# Patient Record
Sex: FEMALE | Race: WHITE
[De-identification: names, ages, dates, MRNs, and addresses within clinical notes are randomized per-mention and may not be internally consistent; named-entity substitution may affect disease eponyms.]

---

## 2017-10-26 ENCOUNTER — HOSPITAL ENCOUNTER (EMERGENCY)
Dept: HOSPITAL 25 - UCCORT | Age: 62
Discharge: HOME | End: 2017-10-26
Payer: COMMERCIAL

## 2017-10-26 VITALS — DIASTOLIC BLOOD PRESSURE: 57 MMHG | SYSTOLIC BLOOD PRESSURE: 165 MMHG

## 2017-10-26 DIAGNOSIS — F17.210: ICD-10-CM

## 2017-10-26 DIAGNOSIS — J44.9: Primary | ICD-10-CM

## 2017-10-26 DIAGNOSIS — J45.901: ICD-10-CM

## 2017-10-26 PROCEDURE — G0463 HOSPITAL OUTPT CLINIC VISIT: HCPCS

## 2017-10-26 PROCEDURE — 99212 OFFICE O/P EST SF 10 MIN: CPT

## 2017-10-26 NOTE — UC
Respiratory Complaint HPI





- HPI Summary


HPI Summary: 





Pt presents with c/o of cough and wheezing X 1 week. 





- History of Current Complaint


Chief Complaint: UCRespiratory


Stated Complaint: COUGH,SORE THROAT


Time Seen by Provider: 10/26/17 15:10


Hx Obtained From: Patient


Pregnant?: No


Onset/Duration: Gradual Onset, Lasting Weeks - 1


Timing: Constant


Severity Initially: Mild


Severity Currently: Mild


Character: Cough: Productive, Sputum Description: - clear


Aggravating Factors: Deep Breaths, Recumbent Position


Alleviating Factors: Nothing


Associated Signs And Symptoms: Positive: Wheezing, Nasal Congestion





- Risk Factors


Pulmonary Embolism Risk Factors: Smoking


Cardiac Risk Factors: Smoking


Pseudomonas Risk Factors: Chronic Lung Disease


Tuberculosis Risk Factors: Smoking





- Allergies/Home Medications


Allergies/Adverse Reactions: 


 Allergies











Allergy/AdvReac Type Severity Reaction Status Date / Time


 


No Known Allergies Allergy   Verified 10/26/17 14:30











Home Medications: 


 Home Medications





Incuse Copd Inhaler 1 puff INH ONCE 10/26/17 [History]


Lisinopril [Lisinopril 30 MG-] 30 mg PO DAILY 10/26/17 [History Confirmed 10/26/

17]











PMH/Surg Hx/FS Hx/Imm Hx


Previously Healthy: Yes





- Surgical History


Surgical History: Yes


Surgery Procedure, Year, and Place: tubal.  left ear surgery





- Family History


Known Family History: Positive: Cardiac Disease


Family History: no known cardiovascular issues in family lineage





- Social History


Occupation: Works From/At Home


Lives: With Family


Alcohol Use: None


Substance Use Type: None


Smoking Status (MU): Heavy Every Day Tobacco Smoker


Type: Cigarettes


Amount Used/How Often: 2 packs day


Length of Time of Smoking/Using Tobacco: AGE 17


Have You Smoked in the Last Year: Yes


Household Exposure Type: Cigarettes





Review of Systems


Constitutional: Fatigue


Skin: Negative


Eyes: Negative


ENT: Negative


Respiratory: Shortness Of Breath, Cough, Other - wheezing


Cardiovascular: Negative


Gastrointestinal: Negative


Genitourinary: Negative


Motor: Negative


Neurovascular: Negative


Musculoskeletal: Negative


Neurological: Negative


Psychological: Negative


Is Patient Immunocompromised?: No


All Other Systems Reviewed And Are Negative: Yes





Physical Exam


Triage Information Reviewed: Yes


Appearance: Ill-Appearing


Vital Signs: 


 Initial Vital Signs











Temp  98.5 F   10/26/17 14:21


 


Pulse  77   10/26/17 14:21


 


Resp  28   10/26/17 14:21


 


BP  165/57   10/26/17 14:21


 


Pulse Ox  95   10/26/17 14:21











Vital Signs Reviewed: Yes


Eye Exam: Normal


ENT Exam: Other


ENT: Positive: Nasal congestion


Neck exam: Normal


Respiratory Exam: Other


Respiratory: Positive: Decreased breath sounds, Wheezing


Cardiovascular Exam: Normal


Musculoskeletal Exam: Normal


Neurological Exam: Normal


Psychological Exam: Normal


Skin Exam: Normal





UC Diagnostic Evaluation





- Laboratory


O2 Sat by Pulse Oximetry: 95





Respiratory Course/Dx





- Differential Dx/Diagnosis


Differential Diagnosis/HQI/PQRI: Bronchitis


Provider Diagnoses: bronchitis.  exacerbation of COPD





Discharge





- Discharge Plan


Condition: Stable


Disposition: HOME


Prescriptions: 


Albuterol HFA INHALER* [Ventolin HFA Inhaler*] 2 puff INH Q4H PRN #1 mdi


 PRN Reason: Sob/Wheezing


Azithromycin TAB* [Zithromax TAB (Z-DEISY) 250 mg #6 tabs] 2 tab PO .TODAY, THEN 

1 DAILY #1 deisy


Benzonatate CAP* [Tessalon 100 MG CAP*] 100 mg PO Q8H PRN #30 cap


 PRN Reason: Cough


Cetirizine* [ZyrTEC 10 MG TAB*] 10 mg PO DAILY #20 tab


methylPREDNISolone TAB* [Medrol TAB*] 4 - 8 mg PO .SEE DEISY #1 deisy


Patient Education Materials:  Acute Bronchitis (ED), Allergies (ED), Wheezing (

ED)


Referrals: 


LUIS Morrow [Medical Doctor] - If Needed

## 2019-10-17 ENCOUNTER — HOSPITAL ENCOUNTER (EMERGENCY)
Dept: HOSPITAL 25 - UCCORT | Age: 64
Discharge: HOME | End: 2019-10-17
Payer: COMMERCIAL

## 2019-10-17 VITALS — SYSTOLIC BLOOD PRESSURE: 149 MMHG | DIASTOLIC BLOOD PRESSURE: 54 MMHG

## 2019-10-17 DIAGNOSIS — J44.9: ICD-10-CM

## 2019-10-17 DIAGNOSIS — I10: ICD-10-CM

## 2019-10-17 DIAGNOSIS — F17.210: ICD-10-CM

## 2019-10-17 DIAGNOSIS — E78.5: ICD-10-CM

## 2019-10-17 DIAGNOSIS — J18.9: Primary | ICD-10-CM

## 2019-10-17 PROCEDURE — G0463 HOSPITAL OUTPT CLINIC VISIT: HCPCS

## 2019-10-17 PROCEDURE — 99212 OFFICE O/P EST SF 10 MIN: CPT

## 2019-10-17 PROCEDURE — 71046 X-RAY EXAM CHEST 2 VIEWS: CPT

## 2019-10-17 NOTE — UC
Respiratory Complaint HPI





- HPI Summary


HPI Summary: 





Per RN triage:


"Pt has been coughing since Tuesday am. Pt is not sleeping due to cough and 

becomes short of breath with exertion. "


-sx started 10/15 AM. smokes 2 PPD. uses inctuise and alb prn. last alb was > 6 

hrs ago. 


-denies fevers/chills. + COPD


-she is deaf.  is with her and he helps translate by lip reading him. 


-she has nebulizer at home but hasnt used it. O2 sat usually runs 94%








- History of Current Complaint


Chief Complaint: UCRespiratory


Stated Complaint: COUGH,NO SLEEP,COUGH


Time Seen by Provider: 10/17/19 16:35


Pain Intensity: 0





- Allergies/Home Medications


Allergies/Adverse Reactions: 


 Allergies











Allergy/AdvReac Type Severity Reaction Status Date / Time


 


No Known Allergies Allergy   Verified 10/17/19 16:38











Home Medications: 


 Home Medications





Amlodipine Besylate [Norvasc] 10 mg PO DAILY 10/17/19 [History Confirmed 10/17/

19]


Aspirin 81 mg CHEW TAB* 81 mg PO DAILY 10/17/19 [History Confirmed 10/17/19]


Atorvastatin* [Lipitor 20 MG*] 20 mg PO 1700 10/17/19 [History Confirmed 10/17/

19]


Lisinopril [Lisinopril 30 MG-] 30 mg PO DAILY 10/17/19 [History Confirmed 10/17/

19]


Loratadine 10 mg PO DAILY 10/17/19 [History Confirmed 10/17/19]


Nitroglycerin 0.4 mg SL SEE INSTRUCTIONS PRN 10/17/19 [History Confirmed 10/17/

19]


Omeprazole 40 mg PO DAILY 10/17/19 [History Confirmed 10/17/19]


Umeclidinium Bromide [Incruse Ellipta] 62.5 mcg IN DAILY 10/17/19 [History 

Confirmed 10/17/19]


hydroCHLOROthiazide [Hydrochlorothiazide] 12.5 mg PO DAILY 10/17/19 [History 

Confirmed 10/17/19]











PMH/Surg Hx/FS Hx/Imm Hx


Endocrine History: Dyslipidemia


Cardiovascular History: Hypertension


Respiratory History: COPD





- Surgical History


Surgical History: Yes


Surgery Procedure, Year, and Place: tubal.  left ear surgery





- Family History


Known Family History: Positive: Cardiac Disease


Family History: no known cardiovascular issues in family lineage





- Social History


Alcohol Use: None


Substance Use Type: None


Smoking Status (MU): Heavy Every Day Tobacco Smoker


Type: Cigarettes


Amount Used/How Often: 2 packs day


Length of Time of Smoking/Using Tobacco: AGE 17


Have You Smoked in the Last Year: Yes


Household Exposure Type: Cigarettes





Review of Systems


All Other Systems Reviewed And Are Negative: Yes


Constitutional: Positive: Negative


Skin: Positive: Negative.  Negative: Rash


Eyes: Positive: Negative


ENT: Positive: Negative


Respiratory: Positive: Cough


Cardiovascular: Positive: Negative.  Negative: Palpitations, Chest Pain


Gastrointestinal: Positive: Negative.  Negative: Vomiting, Diarrhea, Nausea


Genitourinary: Positive: Negative


Motor: Positive: Negative


Neurovascular: Positive: Negative


Musculoskeletal: Positive: Negative


Neurological: Positive: Negative


Psychological: Positive: Negative


Is Patient Immunocompromised?: No





Physical Exam


Triage Information Reviewed: Yes


Appearance: Well-Nourished - appears mild-moderately ill


Vital Signs: 


 Initial Vital Signs











Temp  97.8 F   10/17/19 16:31


 


Pulse  66   10/17/19 16:31


 


Resp  20   10/17/19 16:31


 


BP  149/54   10/17/19 16:31


 


Pulse Ox  90   10/17/19 16:31











Eye Exam: Normal


ENT Exam: Normal


ENT: Positive: Pharynx normal, TMs normal


Neck exam: Normal


Neck: Positive: Supple, Nontender, No Lymphadenopathy


Respiratory: Positive: Chest non-tender, Rhonchi - loud b.l throughout w/ mod 

bretah sounds. appears comnfortable other than mild-mod cough. slightly 

decreased after alb neb. she feels more comfortable. )2 90-91% post-neb.  

Negative: No respiratory distress, No accessory muscle use, Crackles, Wheezing


Cardiovascular Exam: Normal


Cardiovascular: Positive: RRR


Abdominal Exam: Normal


Musculoskeletal Exam: Normal


Neurological Exam: Normal


Psychological Exam: Normal


Skin Exam: Normal





Respiratory Course/Dx





- Course


Course Of Treatment: 





CXR: "IMPRESSION: MINIMAL LINEAR ATELECTASIS VERSUS EARLY CONSOLIDATION OF THE 

LEFT LUNG BASE."


-has some imporvement in sx after neb. she appears comfaortable. 


-they do not think she needs to go to the ER


-she smokes 2 PPD. recommend dc smoking


-agrees to go to ER if sx worsen prior to f/u with PCP or thereafter





- Differential Dx/Diagnosis


Differential Diagnosis/HQI/PQRI: Asthma, Bronchitis, Lower Resp Infection


Provider Diagnosis: 


 Pneumonia








Discharge ED





- Sign-Out/Discharge


Documenting (check all that apply): Patient Departure


All imaging exams completed and their final reports reviewed: Yes





- Discharge Plan


Condition: Stable


Disposition: HOME


Prescriptions: 


Amoxicillin/Clavulanate TAB* [Augmentin *] 875 mg PO BID #20 tab


predniSONE [Prednisone 20 MG TAB] 40 mg PO DAILY #10 tablet


Patient Education Materials:  Pneumonia (ED)


Referrals: 


Aixa Mullins MD [Primary Care Provider] - 


Additional Instructions: 


-It is recommended that you take a probiotic daily while you are on 

antibiotics. A few common brands that you can buy over the counter are colon 

health, align and florastor. These can help prevent a colon infection called c 

diff that can be associated with antibiotic use. 


--We talked about the potential side effects of prednisone including but not 

limited too increased energy/decreased sleep, stomach upset, irritability, 

hunger, elevated blood sugars and blood pressures, problems with your adrenal 

glands and cut off of the blood supply going to your hip. The latter symptoms 

are more typical of long term or frequent use of steroids.  


-Please follow up with your PCP tomorrow. I am concerned about your oxygen 

being 90%. If your symptoms worsen however, you should go directly to the ER. 





- Billing Disposition and Condition


Condition: STABLE


Disposition: Home